# Patient Record
Sex: FEMALE | URBAN - METROPOLITAN AREA
[De-identification: names, ages, dates, MRNs, and addresses within clinical notes are randomized per-mention and may not be internally consistent; named-entity substitution may affect disease eponyms.]

---

## 2022-04-04 ENCOUNTER — TELEPHONE (OUTPATIENT)
Dept: OBSTETRICS AND GYNECOLOGY | Facility: CLINIC | Age: 22
End: 2022-04-04
Payer: COMMERCIAL

## 2022-04-05 ENCOUNTER — OFFICE VISIT (OUTPATIENT)
Dept: OBSTETRICS AND GYNECOLOGY | Facility: CLINIC | Age: 22
End: 2022-04-05
Payer: COMMERCIAL

## 2022-04-05 VITALS
DIASTOLIC BLOOD PRESSURE: 70 MMHG | BODY MASS INDEX: 22.1 KG/M2 | SYSTOLIC BLOOD PRESSURE: 112 MMHG | HEIGHT: 64 IN | WEIGHT: 129.44 LBS

## 2022-04-05 DIAGNOSIS — N93.9 ABNORMAL UTERINE BLEEDING: Primary | ICD-10-CM

## 2022-04-05 PROCEDURE — 88175 CYTOPATH C/V AUTO FLUID REDO: CPT | Performed by: ADVANCED PRACTICE MIDWIFE

## 2022-04-05 PROCEDURE — 99999 PR PBB SHADOW E&M-EST. PATIENT-LVL III: CPT | Mod: PBBFAC,,, | Performed by: ADVANCED PRACTICE MIDWIFE

## 2022-04-05 PROCEDURE — 1159F MED LIST DOCD IN RCRD: CPT | Mod: CPTII,S$GLB,, | Performed by: ADVANCED PRACTICE MIDWIFE

## 2022-04-05 PROCEDURE — 99203 OFFICE O/P NEW LOW 30 MIN: CPT | Mod: S$GLB,,, | Performed by: ADVANCED PRACTICE MIDWIFE

## 2022-04-05 PROCEDURE — 99203 PR OFFICE/OUTPT VISIT, NEW, LEVL III, 30-44 MIN: ICD-10-PCS | Mod: S$GLB,,, | Performed by: ADVANCED PRACTICE MIDWIFE

## 2022-04-05 PROCEDURE — 3008F BODY MASS INDEX DOCD: CPT | Mod: CPTII,S$GLB,, | Performed by: ADVANCED PRACTICE MIDWIFE

## 2022-04-05 PROCEDURE — 3074F SYST BP LT 130 MM HG: CPT | Mod: CPTII,S$GLB,, | Performed by: ADVANCED PRACTICE MIDWIFE

## 2022-04-05 PROCEDURE — 3074F PR MOST RECENT SYSTOLIC BLOOD PRESSURE < 130 MM HG: ICD-10-PCS | Mod: CPTII,S$GLB,, | Performed by: ADVANCED PRACTICE MIDWIFE

## 2022-04-05 PROCEDURE — 3078F DIAST BP <80 MM HG: CPT | Mod: CPTII,S$GLB,, | Performed by: ADVANCED PRACTICE MIDWIFE

## 2022-04-05 PROCEDURE — 99999 PR PBB SHADOW E&M-EST. PATIENT-LVL III: ICD-10-PCS | Mod: PBBFAC,,, | Performed by: ADVANCED PRACTICE MIDWIFE

## 2022-04-05 PROCEDURE — 3078F PR MOST RECENT DIASTOLIC BLOOD PRESSURE < 80 MM HG: ICD-10-PCS | Mod: CPTII,S$GLB,, | Performed by: ADVANCED PRACTICE MIDWIFE

## 2022-04-05 PROCEDURE — 3008F PR BODY MASS INDEX (BMI) DOCUMENTED: ICD-10-PCS | Mod: CPTII,S$GLB,, | Performed by: ADVANCED PRACTICE MIDWIFE

## 2022-04-05 PROCEDURE — 1159F PR MEDICATION LIST DOCUMENTED IN MEDICAL RECORD: ICD-10-PCS | Mod: CPTII,S$GLB,, | Performed by: ADVANCED PRACTICE MIDWIFE

## 2022-04-05 NOTE — PROGRESS NOTES
"Lakia Rodriguez is a 22 y.o. female  presents to  GYN Clinic with complaint of  Episode of irregular bleeding this month. Pt reports usually with monthly cycles but this month had 10 days of bleeding/ spotting after her usual cycle. Pt is sexually active with women. Does not use a BCM.. Pt also with report of lower R side abdominal pressure on a few occasions when she had the irregualr cycle.         ROS:  GENERAL: No fever, chills, fatigability or weight loss.  VULVAR: No pain, no lesions and no itching.  VAGINAL: No relaxation, and no lesions. As above  ABDOMEN: No abdominal pain. Denies nausea. Denies vomiting. No diarrhea. No constipation  BREAST: Denies pain. No lumps. No discharge.  URINARY: No incontinence, no nocturia, no frequency and no dysuria.        Review of patient's allergies indicates:   Allergen Reactions    Latex, natural rubber      No current outpatient medications on file.    History reviewed. No pertinent past medical history.  History reviewed. No pertinent surgical history.  Social History     Tobacco Use    Smoking status: Never Smoker    Smokeless tobacco: Never Used   Substance Use Topics    Alcohol use: Yes    Drug use: Never     OB History    Para Term  AB Living   0 0 0 0 0 0   SAB IAB Ectopic Multiple Live Births   0 0 0 0 0       /70   Ht 5' 4" (1.626 m)   Wt 58.7 kg (129 lb 6.6 oz)   LMP 2022   BMI 22.21 kg/m²     PHYSICAL EXAM:  GENERAL: Calm and appropriate affect, alert, oriented x4  SKIN: Color appropriate for race, warm and dry, clean and intact with no rashes.  RESP: Even, unlabored breathing  ABDOMEN: Soft, nontender, no masses.  :   Normal external female genitalia without lesions. Normal hair distribution. Adequate perineal body, normal urethral meatus.  Vagina pink and well rugated, no lesions, vaginal discharge -  Slight, white  No significant cystocele or rectocele.  Cervix pink without discharge or lesions, no cervical " motion tenderness.  Uterus 4-6 weeks size, regular, mobile and nontender.  Adnexa: normal adnexa in size, nontender and no masses        ASSESSMENT / PLAN:    ICD-10-CM ICD-9-CM    1. Abnormal uterine bleeding  N93.9 626.9 US Pelvis Comp with Transvag NON-OB (xpd      Liquid-Based Pap Smear, Screening     Abnormal uterine bleeding  -     US Pelvis Comp with Transvag NON-OB (xpd; Future; Expected date: 04/05/2022  -     Liquid-Based Pap Smear, Screening          Patient was counseled today on vaginitis prevention including :  a. avoiding feminine products such as deoderant soaps, body wash, bubble bath, douches, scented toilet paper, deoderant tampons or pads, feminine wipes, chronic pad use, etc.  b. avoiding other vulvovaginal irritants such as long hot baths, humidity, tight, synthetic clothing, chlorine and sitting around in wet bathing suits  c. wearing cotton underwear, avoiding thong underwear and no underwear to bed  d. taking showers instead of baths and use a hair dryer on cool setting afterwards to dry  e. wearing cotton to exercise and shower immediately after exercise and change clothes  f. using polyurethane condoms without spermicide if sexually active and symptoms are triggered by intercourse  g. Discussed use of vagisil, along with repHresh and probiotics    FOLLOW UP:   Pending lab results, PRN lack of improvement.

## 2022-04-06 ENCOUNTER — HOSPITAL ENCOUNTER (OUTPATIENT)
Dept: RADIOLOGY | Facility: OTHER | Age: 22
Discharge: HOME | End: 2022-04-06
Attending: ADVANCED PRACTICE MIDWIFE
Payer: COMMERCIAL

## 2022-04-06 ENCOUNTER — PATIENT MESSAGE (OUTPATIENT)
Dept: OBSTETRICS AND GYNECOLOGY | Facility: CLINIC | Age: 22
End: 2022-04-06
Payer: COMMERCIAL

## 2022-04-06 DIAGNOSIS — N93.9 ABNORMAL UTERINE BLEEDING: ICD-10-CM

## 2022-04-06 PROCEDURE — 76830 US PELVIS COMP WITH TRANSVAG NON-OB (XPD): ICD-10-PCS | Mod: 26,,, | Performed by: INTERNAL MEDICINE

## 2022-04-06 PROCEDURE — 76830 TRANSVAGINAL US NON-OB: CPT | Mod: 26,,, | Performed by: INTERNAL MEDICINE

## 2022-04-06 PROCEDURE — 76856 US EXAM PELVIC COMPLETE: CPT | Mod: 26,,, | Performed by: INTERNAL MEDICINE

## 2022-04-06 PROCEDURE — 76856 US PELVIS COMP WITH TRANSVAG NON-OB (XPD): ICD-10-PCS | Mod: 26,,, | Performed by: INTERNAL MEDICINE

## 2022-04-06 PROCEDURE — 76830 TRANSVAGINAL US NON-OB: CPT | Mod: TC

## 2022-05-05 ENCOUNTER — TELEPHONE (OUTPATIENT)
Dept: ORTHOPEDICS | Facility: CLINIC | Age: 22
End: 2022-05-05
Payer: COMMERCIAL

## 2022-05-05 DIAGNOSIS — R52 PAIN: Primary | ICD-10-CM

## 2022-05-05 NOTE — TELEPHONE ENCOUNTER
Contacted patient to get further information on laterality for Monday's appt. Patient injured her thumb on her right hand. Patient had x-rays taken on May 8th from a different hospital (outside of [facility]) New x-rays will be placed prior to Monday's appt.

## 2022-05-17 ENCOUNTER — HOSPITAL ENCOUNTER (OUTPATIENT)
Dept: RADIOLOGY | Facility: HOSPITAL | Age: 22
Discharge: HOME | End: 2022-05-17
Attending: ORTHOPAEDIC SURGERY
Payer: COMMERCIAL

## 2022-05-17 ENCOUNTER — OFFICE VISIT (OUTPATIENT)
Dept: ORTHOPEDICS | Facility: CLINIC | Age: 22
End: 2022-05-17
Payer: COMMERCIAL

## 2022-05-17 VITALS — HEIGHT: 64 IN | BODY MASS INDEX: 21.46 KG/M2 | WEIGHT: 125.69 LBS

## 2022-05-17 DIAGNOSIS — S63.639S VOLAR PLATE INJURY OF FINGER, SEQUELA: Primary | ICD-10-CM

## 2022-05-17 DIAGNOSIS — G89.29 CHRONIC PAIN OF RIGHT THUMB: ICD-10-CM

## 2022-05-17 DIAGNOSIS — R52 PAIN: ICD-10-CM

## 2022-05-17 DIAGNOSIS — M79.644 CHRONIC PAIN OF RIGHT THUMB: ICD-10-CM

## 2022-05-17 PROCEDURE — 73130 XR HAND COMPLETE 3 VIEW RIGHT: ICD-10-PCS | Mod: 26,RT,, | Performed by: INTERNAL MEDICINE

## 2022-05-17 PROCEDURE — 73130 X-RAY EXAM OF HAND: CPT | Mod: TC,RT

## 2022-05-17 PROCEDURE — 99204 PR OFFICE/OUTPT VISIT, NEW, LEVL IV, 45-59 MIN: ICD-10-PCS | Mod: S$GLB,,, | Performed by: ORTHOPAEDIC SURGERY

## 2022-05-17 PROCEDURE — 99999 PR PBB SHADOW E&M-EST. PATIENT-LVL III: CPT | Mod: PBBFAC,,, | Performed by: ORTHOPAEDIC SURGERY

## 2022-05-17 PROCEDURE — 3008F PR BODY MASS INDEX (BMI) DOCUMENTED: ICD-10-PCS | Mod: CPTII,S$GLB,, | Performed by: ORTHOPAEDIC SURGERY

## 2022-05-17 PROCEDURE — 99999 PR PBB SHADOW E&M-EST. PATIENT-LVL III: ICD-10-PCS | Mod: PBBFAC,,, | Performed by: ORTHOPAEDIC SURGERY

## 2022-05-17 PROCEDURE — 73130 X-RAY EXAM OF HAND: CPT | Mod: 26,RT,, | Performed by: INTERNAL MEDICINE

## 2022-05-17 PROCEDURE — 99204 OFFICE O/P NEW MOD 45 MIN: CPT | Mod: S$GLB,,, | Performed by: ORTHOPAEDIC SURGERY

## 2022-05-17 PROCEDURE — 1160F RVW MEDS BY RX/DR IN RCRD: CPT | Mod: CPTII,S$GLB,, | Performed by: ORTHOPAEDIC SURGERY

## 2022-05-17 PROCEDURE — 1160F PR REVIEW ALL MEDS BY PRESCRIBER/CLIN PHARMACIST DOCUMENTED: ICD-10-PCS | Mod: CPTII,S$GLB,, | Performed by: ORTHOPAEDIC SURGERY

## 2022-05-17 PROCEDURE — 1159F MED LIST DOCD IN RCRD: CPT | Mod: CPTII,S$GLB,, | Performed by: ORTHOPAEDIC SURGERY

## 2022-05-17 PROCEDURE — 3008F BODY MASS INDEX DOCD: CPT | Mod: CPTII,S$GLB,, | Performed by: ORTHOPAEDIC SURGERY

## 2022-05-17 PROCEDURE — 1159F PR MEDICATION LIST DOCUMENTED IN MEDICAL RECORD: ICD-10-PCS | Mod: CPTII,S$GLB,, | Performed by: ORTHOPAEDIC SURGERY

## 2022-05-17 NOTE — PROGRESS NOTES
[facility]  History & Physical  Orthopedics    SUBJECTIVE:      COVID-19 attestation:  This patient was treated during the COVID-19 pandemic.  This was discussed with the patient, they are aware of our current policies and procedures, were given the option of delaying their visit and or switching to a virtual visit, delaying their surgery when applicable, and they elect to proceed.    Chief Complaint:   Chief Complaint   Patient presents with    Right Hand - Pain       Referring Provider: none    History of Present Illness:  Patient is a 22 y.o. right hand dominant female who presents today with complaints of right thumb pain since an injury 02/19/2022 playing rugby. She states that the finger was impacted, was painful and became swollen the next day. Since then she feels that she has had decreased flexion and discomfort with daily activities. She denies numbness and tingling.    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    Symptoms are aggravated by activity and movement.    Symptoms are alleviated by rest.    Symptoms consist of pain, swelling and decreased ROM.    The patient rates their pain as a 1/10.    Attempted treatment(s) and/or interventions include rest, activity modification and anti-inflammatory medications.     The patient denies any fevers, chills, N/V, D/C and presents for evaluation.       History reviewed. No pertinent past medical history.  History reviewed. No pertinent surgical history.  Review of patient's allergies indicates:   Allergen Reactions    Latex, natural rubber      Social History     Social History Narrative    Not on file     History reviewed. No pertinent family history.    No current outpatient medications on file.    ROS    Review of Systems:  Constitutional: no fever or chills  Eyes: no visual changes  ENT: no nasal congestion or sore throat  Respiratory: no cough or shortness of breath  Cardiovascular: no chest  "pain  Gastrointestinal: no nausea or vomiting, tolerating diet  Musculoskeletal: pain and decreased ROM    OBJECTIVE:      Vital Signs (Most Recent):  Vitals:    05/17/22 1520   Weight: 57 kg (125 lb 11.2 oz)   Height: 5' 4" (1.626 m)     Body mass index is 21.58 kg/m².    Physical Exam    Physical Exam:  Constitutional: The patient appears well-developed and well-nourished. No distress.   Head: Normocephalic and atraumatic.   Nose: Nose normal.   Eyes: Conjunctivae and EOM are normal.   Neck: No tracheal deviation present.   Cardiovascular: Normal rate and intact distal pulses.    Pulmonary/Chest: Effort normal. No respiratory distress.   Abdominal: There is no guarding.   Lymphatic: Negative for adenopathy   Neurological: The patient is alert.   Psychiatric: The patient has a normal mood and affect.     Bilateral Hand/Wrist Examination:    Observation/Inspection:  Swelling  Right thumb    Deformity  none  Discoloration  none     Scars   none    Atrophy  none    HAND/WRIST EXAMINATION:  Finkelstein's Test   Neg  WHAT Test    Neg  Snuff box tenderness   Neg  Feliciano's Test    Neg  Hook of Hamate Tenderness  Neg  CMC grind    Neg  Circumduction test   Neg  TFCC Compression Test  Neg    Right thumb tender to palpation volarly and dorsally, 0-30 degrees MP flexion, NTTP IP and MP joints; no instability to varus and valgus stress of UCL and RCL thumb  Left ROM hand/wrist/elbow full    Neurovascular Exam:  Digits WWP, brisk CR < 3s throughout  NVI motor/LTS to M/R/U nerves, radial pulse 2+    Diagnostic Results:       X-rays AP, lateral and oblique right hand taken today are independently reviewed by me and show no bony or ligamentous instability.        ASSESSMENT/PLAN:      22 y.o. yo female with   Encounter Diagnoses   Name Primary?    Volar plate injury of finger, sequela Yes    Chronic pain of right thumb       Plan:  We have discussed the natural history of volar plate and capsular injuries to the fingers " including treatment options such as splinting, oral and topical anti-inflammatories, cortisone injections and therapy. I have referred her to therapy for multimodality therapy including ROM and strengthening.    Follow in 6-8 weeks or up as needed for any worsening of symptoms.      The patient's pathophysiology was explained in detail with reference to x-rays, models, other visual aids as appropriate.  Treatment options were discussed in detail.  Questions were invited and answered to the patient's satisfaction. I reviewed Primary care , and other specialty's notes to better coordinate patient's care.          Kalani Bai MD     Please be aware that this note has been generated with the assistance of MModal voice-to-text.  Please excuse any spelling or grammatical errors.

## 2022-06-10 ENCOUNTER — TELEPHONE (OUTPATIENT)
Dept: INTERNAL MEDICINE | Facility: CLINIC | Age: 22
End: 2022-06-10

## 2022-06-10 NOTE — TELEPHONE ENCOUNTER
Spoke w pt and notified her of appt cancellation ([place]) and rescheduled on a later date w another provider.

## 2022-06-17 ENCOUNTER — LAB VISIT (OUTPATIENT)
Dept: LAB | Facility: HOSPITAL | Age: 22
End: 2022-06-17
Payer: COMMERCIAL

## 2022-06-17 ENCOUNTER — OFFICE VISIT (OUTPATIENT)
Dept: INTERNAL MEDICINE | Facility: CLINIC | Age: 22
End: 2022-06-17
Payer: COMMERCIAL

## 2022-06-17 VITALS
BODY MASS INDEX: 22.58 KG/M2 | WEIGHT: 132.25 LBS | DIASTOLIC BLOOD PRESSURE: 80 MMHG | HEART RATE: 80 BPM | HEIGHT: 64 IN | SYSTOLIC BLOOD PRESSURE: 100 MMHG | OXYGEN SATURATION: 98 %

## 2022-06-17 DIAGNOSIS — Z00.00 HEALTHCARE MAINTENANCE: Primary | ICD-10-CM

## 2022-06-17 DIAGNOSIS — J02.9 VIRAL PHARYNGITIS: ICD-10-CM

## 2022-06-17 DIAGNOSIS — D22.9 NEVUS: ICD-10-CM

## 2022-06-17 DIAGNOSIS — Z00.00 HEALTHCARE MAINTENANCE: ICD-10-CM

## 2022-06-17 PROCEDURE — 99999 PR PBB SHADOW E&M-EST. PATIENT-LVL IV: CPT | Mod: PBBFAC,,,

## 2022-06-17 PROCEDURE — 86803 HEPATITIS C AB TEST: CPT

## 2022-06-17 PROCEDURE — 99999 PR PBB SHADOW E&M-EST. PATIENT-LVL IV: ICD-10-PCS | Mod: PBBFAC,,,

## 2022-06-17 PROCEDURE — 36415 COLL VENOUS BLD VENIPUNCTURE: CPT

## 2022-06-17 PROCEDURE — 99385 PR PREVENTIVE VISIT,NEW,18-39: ICD-10-PCS | Mod: S$GLB,,,

## 2022-06-17 PROCEDURE — 99385 PREV VISIT NEW AGE 18-39: CPT | Mod: S$GLB,,,

## 2022-06-17 PROCEDURE — 87389 HIV-1 AG W/HIV-1&-2 AB AG IA: CPT

## 2022-06-17 NOTE — PROGRESS NOTES
"  Subjective     Chief Complaint: "Establish Care"    History of Present Illness:  Ms. Lakia Rodriguez is a 22 y.o. female who presents to [facility]. Pt reports no significant past medical history.  Patient reports she is in good health overall.  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  She lives with several roommates.  She is physically active and plays rugby every week.  Patient has a couple of concerns to discuss today.    Patient reports she has been experiencing a sore throat, chills, fever, and pain with swallowing for 6 days.  She was seen at urgent care 3 days ago and was tested for mono, strep, COVID, and a respiratory culture which were all negative.  She states her symptoms have improved significantly, and that she now only has a mild sore throat and some mild pain with swallowing.  She denies any recent sick contacts.  She denies any current fevers.    Patient also has concerns about a mole on her abdomen.  She states that small has been present for years, but a couple of months ago it became irritated and inflamed, and mildly painful.  It has since returned back to baseline.  She denies any recent growth, changes in color.  Patient reports she is is concerned because her father recently had skin cancer on his scalp and she wants to be extra cautious.    Patient reports a good diet.  She reports occasional alcohol use.  She denies any smoking, smokeless tobacco, or any other drug use.  She is sexually active with males.  She has no concerns about relationships or her home life at this time.  She is pleasant and in good spirits during today's visit.    Review of Systems   Constitutional: Negative for chills and fever.   HENT: Positive for sore throat. Negative for congestion.         Pain with swallowing   Respiratory: Negative for cough and shortness of breath.    Cardiovascular: Negative for chest pain and leg swelling.   Gastrointestinal: Negative for abdominal " "pain, constipation, diarrhea, nausea and vomiting.   Musculoskeletal: Negative for joint pain and myalgias.   Neurological: Negative for dizziness and loss of consciousness.   Psychiatric/Behavioral: Negative for depression.       PAST HISTORY:     History reviewed. No pertinent past medical history.    History reviewed. No pertinent surgical history.    No family history on file.    Social History     Socioeconomic History    Marital status: Unknown   Tobacco Use    Smoking status: Never Smoker    Smokeless tobacco: Never Used   Substance and Sexual Activity    Alcohol use: Yes    Drug use: Never    Sexual activity: Yes     Partners: Male     Birth control/protection: None       MEDICATIONS & ALLERGIES:     No current outpatient medications on file prior to visit.     No current facility-administered medications on file prior to visit.       Review of patient's allergies indicates:   Allergen Reactions    Latex, natural rubber        OBJECTIVE:     Vital Signs:  Vitals:    06/17/22 1438   BP: 100/80   BP Location: Left arm   Patient Position: Sitting   BP Method: Medium (Manual)   Pulse: 80   SpO2: 98%   Weight: 60 kg (132 lb 4.4 oz)   Height: 5' 4" (1.626 m)       Body mass index is 22.71 kg/m².     Physical Exam  Vitals and nursing note reviewed.   Constitutional:       General: She is not in acute distress.     Appearance: Normal appearance. She is not ill-appearing, toxic-appearing or diaphoretic.      Comments: Pleasant, healthy young-adult female sitting in chair in NAD   HENT:      Head: Normocephalic and atraumatic.      Nose: Nose normal. No congestion.      Mouth/Throat:      Mouth: Mucous membranes are moist.      Dentition: Normal dentition.      Pharynx: Uvula midline. Posterior oropharyngeal erythema present. No pharyngeal swelling, oropharyngeal exudate or uvula swelling.      Tonsils: No tonsillar exudate or tonsillar abscesses.   Eyes:      Extraocular Movements: Extraocular movements " intact.      Conjunctiva/sclera: Conjunctivae normal.      Pupils: Pupils are equal, round, and reactive to light.   Cardiovascular:      Rate and Rhythm: Normal rate and regular rhythm.      Pulses: Normal pulses.      Heart sounds: Normal heart sounds. No murmur heard.    No friction rub. No gallop.   Pulmonary:      Effort: Pulmonary effort is normal. No respiratory distress.      Breath sounds: Normal breath sounds.   Abdominal:      General: Abdomen is flat. Bowel sounds are normal. There is no distension.      Palpations: Abdomen is soft.      Tenderness: There is no abdominal tenderness.   Musculoskeletal:         General: No swelling. Normal range of motion.      Cervical back: Normal range of motion and neck supple.   Lymphadenopathy:      Cervical: No cervical adenopathy.   Skin:     General: Skin is warm and dry.      Comments: Circular, well-circumscribed 3mm nevus on L upper abdomen. (see image below)   Neurological:      General: No focal deficit present.      Mental Status: She is alert and oriented to person, place, and time.   Psychiatric:         Mood and Affect: Mood normal.         Behavior: Behavior normal.             Laboratory  No results found for this or any previous visit (from the past 24 hour(s)).    Diagnostic Results:      Health Maintenance Due   Topic Date Due    Hepatitis C Screening  Never done    HIV Screening  Never done         ASSESSMENT & PLAN:     Ms. Lakia Rodriguez is a 22 y.o. female who presents to [facility] care. Patient in good health today. Is physically active and eats a healthy diet. Is in her senior year of college.      Healthcare maintenance  -     HIV 1/2 Ag/Ab (4th Gen); Future; Expected date: 06/17/2022  -     Hepatitis C Antibody; Future; Expected date: 06/17/2022  - Patient is up to date on vaccinations at this time.     Viral pharyngitis        - Patient symptoms and exam consistent with viral pharyngitis. Previous COVID, Mononucleosis, rapid strep  tests and culture swab all negative. Exam notable only for some pharyngeal erythema. Advised patient to stay well hydrated, and to utilize hot tea with honey, as well as chloraseptic lozenges to aid with her sore throat. Advised return to clinic or urgent care if her symptoms worsen or fail to improve.     Nevus       - Patient with benign appearing nevus on L upper abdomen. No new increase in growth, nevus well-circumscribed with regular borders. No abnormal coloration. Advised continued surveillance at this time. Advised patient to return to clinic if any changes are noted. Media uploaded to chart as well to document progression as needed in the future.         RTC in 1 Year    Discussed with Dr. Becker  - staff attestation to follow      Zbigniew Grijalva MD  Internal Medicine PGY1  [facility]  [address] [place] [address] [phone]

## 2022-06-17 NOTE — PATIENT INSTRUCTIONS
It was great seeing you in clinic this afternoon.    I will see you back in clinic in 1 year.    You can [facility] the [email or web address] in the next day or so when they are resulted.    Please call or message with any concerns.

## 2022-06-20 LAB
HCV AB SERPL QL IA: NEGATIVE
HIV 1+2 AB+HIV1 P24 AG SERPL QL IA: NEGATIVE

## 2023-08-02 ENCOUNTER — OFFICE VISIT (OUTPATIENT)
Dept: FAMILY MEDICINE | Facility: CLINIC | Age: 23
End: 2023-08-02
Payer: COMMERCIAL

## 2023-08-02 VITALS
WEIGHT: 138.88 LBS | DIASTOLIC BLOOD PRESSURE: 72 MMHG | OXYGEN SATURATION: 98 % | SYSTOLIC BLOOD PRESSURE: 112 MMHG | HEART RATE: 81 BPM | HEIGHT: 64 IN | BODY MASS INDEX: 23.71 KG/M2

## 2023-08-02 DIAGNOSIS — Z23 NEED FOR TDAP VACCINATION: ICD-10-CM

## 2023-08-02 DIAGNOSIS — R39.9 UTI SYMPTOMS: Primary | ICD-10-CM

## 2023-08-02 DIAGNOSIS — H92.09 OTALGIA, UNSPECIFIED LATERALITY: ICD-10-CM

## 2023-08-02 LAB
BILIRUB SERPL-MCNC: NEGATIVE MG/DL
BLOOD URINE, POC: NEGATIVE
COLOR, POC UA: YELLOW
GLUCOSE UR QL STRIP: NEGATIVE
KETONES UR QL STRIP: NEGATIVE
LEUKOCYTE ESTERASE URINE, POC: NEGATIVE
NITRITE, POC UA: NEGATIVE
PH, POC UA: 5.5
PROTEIN, POC: NEGATIVE
SPECIFIC GRAVITY, POC UA: 1.02
UROBILINOGEN, POC UA: 0.2

## 2023-08-02 PROCEDURE — 90471 IMMUNIZATION ADMIN: CPT | Mod: S$GLB,,, | Performed by: FAMILY MEDICINE

## 2023-08-02 PROCEDURE — 99214 PR OFFICE/OUTPT VISIT, EST, LEVL IV, 30-39 MIN: ICD-10-PCS | Mod: 25,S$GLB,, | Performed by: FAMILY MEDICINE

## 2023-08-02 PROCEDURE — 1159F MED LIST DOCD IN RCRD: CPT | Mod: CPTII,S$GLB,, | Performed by: FAMILY MEDICINE

## 2023-08-02 PROCEDURE — 3078F PR MOST RECENT DIASTOLIC BLOOD PRESSURE < 80 MM HG: ICD-10-PCS | Mod: CPTII,S$GLB,, | Performed by: FAMILY MEDICINE

## 2023-08-02 PROCEDURE — 99999 PR PBB SHADOW E&M-EST. PATIENT-LVL III: CPT | Mod: PBBFAC,,, | Performed by: FAMILY MEDICINE

## 2023-08-02 PROCEDURE — 1159F PR MEDICATION LIST DOCUMENTED IN MEDICAL RECORD: ICD-10-PCS | Mod: CPTII,S$GLB,, | Performed by: FAMILY MEDICINE

## 2023-08-02 PROCEDURE — 3078F DIAST BP <80 MM HG: CPT | Mod: CPTII,S$GLB,, | Performed by: FAMILY MEDICINE

## 2023-08-02 PROCEDURE — 99999 PR PBB SHADOW E&M-EST. PATIENT-LVL III: ICD-10-PCS | Mod: PBBFAC,,, | Performed by: FAMILY MEDICINE

## 2023-08-02 PROCEDURE — 90715 TDAP VACCINE 7 YRS/> IM: CPT | Mod: S$GLB,,, | Performed by: FAMILY MEDICINE

## 2023-08-02 PROCEDURE — 81001 URINALYSIS AUTO W/SCOPE: CPT | Mod: S$GLB,,, | Performed by: FAMILY MEDICINE

## 2023-08-02 PROCEDURE — 99214 OFFICE O/P EST MOD 30 MIN: CPT | Mod: 25,S$GLB,, | Performed by: FAMILY MEDICINE

## 2023-08-02 PROCEDURE — 1160F RVW MEDS BY RX/DR IN RCRD: CPT | Mod: CPTII,S$GLB,, | Performed by: FAMILY MEDICINE

## 2023-08-02 PROCEDURE — 3074F SYST BP LT 130 MM HG: CPT | Mod: CPTII,S$GLB,, | Performed by: FAMILY MEDICINE

## 2023-08-02 PROCEDURE — 90715 TDAP VACCINE GREATER THAN OR EQUAL TO 7YO IM: ICD-10-PCS | Mod: S$GLB,,, | Performed by: FAMILY MEDICINE

## 2023-08-02 PROCEDURE — 81001 POCT URINALYSIS, DIPSTICK OR TABLET REAGENT, AUTOMATED, WITH MICROSCOP: ICD-10-PCS | Mod: S$GLB,,, | Performed by: FAMILY MEDICINE

## 2023-08-02 PROCEDURE — 1160F PR REVIEW ALL MEDS BY PRESCRIBER/CLIN PHARMACIST DOCUMENTED: ICD-10-PCS | Mod: CPTII,S$GLB,, | Performed by: FAMILY MEDICINE

## 2023-08-02 PROCEDURE — 3008F PR BODY MASS INDEX (BMI) DOCUMENTED: ICD-10-PCS | Mod: CPTII,S$GLB,, | Performed by: FAMILY MEDICINE

## 2023-08-02 PROCEDURE — 3008F BODY MASS INDEX DOCD: CPT | Mod: CPTII,S$GLB,, | Performed by: FAMILY MEDICINE

## 2023-08-02 PROCEDURE — 3074F PR MOST RECENT SYSTOLIC BLOOD PRESSURE < 130 MM HG: ICD-10-PCS | Mod: CPTII,S$GLB,, | Performed by: FAMILY MEDICINE

## 2023-08-02 PROCEDURE — 90471 TDAP VACCINE GREATER THAN OR EQUAL TO 7YO IM: ICD-10-PCS | Mod: S$GLB,,, | Performed by: FAMILY MEDICINE

## 2023-08-02 NOTE — PROGRESS NOTES
Office Visit    Patient Name: Lakia Rodriguez    : 2000  MRN: [id number]    Subjective:  Lakia is a 23 y.o. 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     24 yo female with no h/o ear issues who presents for UC follow up for ear concern and also to discuss UTI concerns.  She needs to establish with a new PCP at some point.  Was thinking she could have a physical and establish with me today but explained that I am not taking new patients at this time-she desires to establish with someone potentially closer to where she lives sooner and we will help arrange this.    Last week went to  when she could not hear out of both ears-- dx'd with bilateral otitis externa/swimmer's ear.  She was prescribed Cortisporin otic drops to use in each ear 4 times daily which she has been doing, however, she has not let the drops really settle in her ears as directed.  Discomfort has improved but is still present, hearing has improved, and she denies ear drainage.    No current sinus or allergy concerns.     She also is concerned about her urine recently smelling differently than usual.  She had 1 episode where the urine was cloudy but this has resolved.  No vaginal discharge or discomfort.   No dysuria or hematuria.      Urine Dip normal    PAST MEDICAL HISTORY, SURGICAL/SOCIAL/FAMILY HISTORY REVIEWED AS PER CHART, WITH PERTINENT FINDINGS INCLUDED IN HISTORY SECTION OF NOTE.     Current Medications        Allergies   Review of patient's allergies indicates:   Allergen Reactions    Latex, natural rubber          Review of Systems (Pertinent positives)  Review of Systems   HENT:  Negative for ear discharge, hearing loss and sinus pressure. Ear pain: Mild residual ear canal discomfort.   Genitourinary:  Negative for difficulty urinating, dysuria, hematuria, urgency and vaginal discharge.       /72 (BP Location: Left arm, Patient Position:  "Sitting, BP Method: Medium (Manual))   Pulse 81   Ht 5' 4" (1.626 m)   Wt 63 kg (138 lb 14.2 oz)   SpO2 98%   BMI 23.84 kg/m²     Physical Exam  Vitals reviewed.   Constitutional:       General: She is not in acute distress.     Appearance: Normal appearance. She is well-developed.   HENT:      Head: Normocephalic and atraumatic.      Right Ear: Tympanic membrane, ear canal and external ear normal. There is no impacted cerumen.      Left Ear: Tympanic membrane, ear canal and external ear normal. There is no impacted cerumen.   Eyes:      Conjunctiva/sclera: Conjunctivae normal.   Cardiovascular:      Rate and Rhythm: Normal rate.   Pulmonary:      Effort: Pulmonary effort is normal.   Musculoskeletal:      Right lower leg: No edema.      Left lower leg: No edema.   Skin:     General: Skin is warm and dry.   Neurological:      General: No focal deficit present.      Mental Status: She is alert and oriented to person, place, and time.   Psychiatric:         Mood and Affect: Mood normal.         Behavior: Behavior normal.         Assessment/Plan:  Lakia Rodriguez is a 23 y.o. female who presents today for :        ICD-10-CM ICD-9-CM    1. UTI symptoms  R39.9 788.99 POCT urinalysis, dipstick or tablet reag      2. Otalgia, unspecified laterality  H92.09 388.70       3. Need for Tdap vaccination  Z23 V06.1 (In Office Administered) Tdap Vaccine        Reassurance provided regarding malodorous urine-advised that it may be related to the food she is eating but did not be concerned in the absence of any other symptoms and normal urine dip today.      Regarding her ear concerns-she does have some mild residual canal discomfort but overall her exam is normal and reassurance was provided that she is adequately treated her swimmer's ear with the Cortisporin otic drops.      Tdap updated today and we are helping her to establish with a new PCP who is currently taking new patients.    There are no Patient Instructions on " file for this visit.      Follow up for return as needed for new concerns.